# Patient Record
Sex: FEMALE | Race: OTHER | HISPANIC OR LATINO | URBAN - METROPOLITAN AREA
[De-identification: names, ages, dates, MRNs, and addresses within clinical notes are randomized per-mention and may not be internally consistent; named-entity substitution may affect disease eponyms.]

---

## 2019-02-06 ENCOUNTER — OUTPATIENT (OUTPATIENT)
Dept: OUTPATIENT SERVICES | Facility: HOSPITAL | Age: 59
LOS: 1 days | Discharge: HOME | End: 2019-02-06
Payer: OTHER MISCELLANEOUS

## 2019-02-06 VITALS
SYSTOLIC BLOOD PRESSURE: 110 MMHG | HEART RATE: 78 BPM | WEIGHT: 145.95 LBS | RESPIRATION RATE: 16 BRPM | DIASTOLIC BLOOD PRESSURE: 64 MMHG | OXYGEN SATURATION: 99 % | HEIGHT: 63 IN | TEMPERATURE: 98 F

## 2019-02-06 DIAGNOSIS — Z96.651 PRESENCE OF RIGHT ARTIFICIAL KNEE JOINT: Chronic | ICD-10-CM

## 2019-02-06 DIAGNOSIS — Z90.49 ACQUIRED ABSENCE OF OTHER SPECIFIED PARTS OF DIGESTIVE TRACT: Chronic | ICD-10-CM

## 2019-02-06 DIAGNOSIS — Z98.82 BREAST IMPLANT STATUS: Chronic | ICD-10-CM

## 2019-02-06 DIAGNOSIS — M12.261 VILLONODULAR SYNOVITIS (PIGMENTED), RIGHT KNEE: ICD-10-CM

## 2019-02-06 DIAGNOSIS — Z98.890 OTHER SPECIFIED POSTPROCEDURAL STATES: Chronic | ICD-10-CM

## 2019-02-06 DIAGNOSIS — Z96.652 PRESENCE OF LEFT ARTIFICIAL KNEE JOINT: Chronic | ICD-10-CM

## 2019-02-06 LAB
ANION GAP SERPL CALC-SCNC: 6 MMOL/L — SIGNIFICANT CHANGE UP (ref 5–17)
BASOPHILS # BLD AUTO: 0.03 K/UL — SIGNIFICANT CHANGE UP (ref 0–0.2)
BASOPHILS NFR BLD AUTO: 0.2 % — SIGNIFICANT CHANGE UP (ref 0–2)
BUN SERPL-MCNC: 14 MG/DL — SIGNIFICANT CHANGE UP (ref 7–23)
CALCIUM SERPL-MCNC: 9 MG/DL — SIGNIFICANT CHANGE UP (ref 8.5–10.1)
CHLORIDE SERPL-SCNC: 105 MMOL/L — SIGNIFICANT CHANGE UP (ref 96–108)
CO2 SERPL-SCNC: 26 MMOL/L — SIGNIFICANT CHANGE UP (ref 22–31)
CREAT SERPL-MCNC: 0.7 MG/DL — SIGNIFICANT CHANGE UP (ref 0.5–1.3)
EOSINOPHIL # BLD AUTO: 0.09 K/UL — SIGNIFICANT CHANGE UP (ref 0–0.5)
EOSINOPHIL NFR BLD AUTO: 0.7 % — SIGNIFICANT CHANGE UP (ref 0–6)
GLUCOSE SERPL-MCNC: 111 MG/DL — HIGH (ref 70–99)
HCT VFR BLD CALC: 43.9 % — SIGNIFICANT CHANGE UP (ref 34.5–45)
HGB BLD-MCNC: 14 G/DL — SIGNIFICANT CHANGE UP (ref 11.5–15.5)
IMM GRANULOCYTES NFR BLD AUTO: 0.3 % — SIGNIFICANT CHANGE UP (ref 0–1.5)
LYMPHOCYTES # BLD AUTO: 17.1 % — SIGNIFICANT CHANGE UP (ref 13–44)
LYMPHOCYTES # BLD AUTO: 2.29 K/UL — SIGNIFICANT CHANGE UP (ref 1–3.3)
MCHC RBC-ENTMCNC: 28.9 PG — SIGNIFICANT CHANGE UP (ref 27–34)
MCHC RBC-ENTMCNC: 31.9 GM/DL — LOW (ref 32–36)
MCV RBC AUTO: 90.5 FL — SIGNIFICANT CHANGE UP (ref 80–100)
MONOCYTES # BLD AUTO: 0.39 K/UL — SIGNIFICANT CHANGE UP (ref 0–0.9)
MONOCYTES NFR BLD AUTO: 2.9 % — SIGNIFICANT CHANGE UP (ref 2–14)
NEUTROPHILS # BLD AUTO: 10.57 K/UL — HIGH (ref 1.8–7.4)
NEUTROPHILS NFR BLD AUTO: 78.8 % — HIGH (ref 43–77)
NRBC # BLD: 0 /100 WBCS — SIGNIFICANT CHANGE UP (ref 0–0)
PLATELET # BLD AUTO: 348 K/UL — SIGNIFICANT CHANGE UP (ref 150–400)
POTASSIUM SERPL-MCNC: 4.9 MMOL/L — SIGNIFICANT CHANGE UP (ref 3.5–5.3)
POTASSIUM SERPL-SCNC: 4.9 MMOL/L — SIGNIFICANT CHANGE UP (ref 3.5–5.3)
RBC # BLD: 4.85 M/UL — SIGNIFICANT CHANGE UP (ref 3.8–5.2)
RBC # FLD: 13.3 % — SIGNIFICANT CHANGE UP (ref 10.3–14.5)
SODIUM SERPL-SCNC: 137 MMOL/L — SIGNIFICANT CHANGE UP (ref 135–145)
WBC # BLD: 13.41 K/UL — HIGH (ref 3.8–10.5)
WBC # FLD AUTO: 13.41 K/UL — HIGH (ref 3.8–10.5)

## 2019-02-06 PROCEDURE — 93010 ELECTROCARDIOGRAM REPORT: CPT

## 2019-02-06 PROCEDURE — 71046 X-RAY EXAM CHEST 2 VIEWS: CPT | Mod: 26

## 2019-02-06 NOTE — H&P PST ADULT - ASSESSMENT
59 year old female presents to PST for right knee arthroscopy    Plan:  1. PST instructions given ; NPO post midnight   2. Pt instructed to take following meds with sip of water : Lexapro pt to inform anesthesia if she takes pain meds; pt instructed to take Breo day of surgery and albuterol prn   3. EZ wash instructions given   4. Medical Evaluation with Dr De La Rosa 59 year old female presents to PST for right knee arthroscopy    Plan:  1. PST instructions given ; NPO post midnight   2. Pt instructed to take following meds with sip of water : Lexapro pt to inform anesthesia if she takes pain meds; pt instructed to take Breo day of surgery and albuterol prn   3. EZ wash instructions given   4. Medical Evaluation with Dr De La Rosa   5. OR booking notified [facility]

## 2019-02-06 NOTE — H&P PST ADULT - PSH
H/O abdominoplasty  2005  H/O breast implant  silicone 2008  H/O colectomy  2009  H/O total knee replacement, left  2015  H/O total knee replacement, right  2009

## 2019-02-06 NOTE — H&P PST ADULT - HISTORY OF PRESENT ILLNESS
59 year old female with synovitis of right knee s/p right TKR 2009 ;  pt c/o right knee pain and clicking sound; takes pain meds with some pain relief ; she presents to PST for right knee arthroscopy  Pt with history of asthma recent exacerbation 2 weeks ago on prednisone 10 mg day 14/14 as prescribed by pulmonologist in [place] ; Lungs clear )2 sat 99% ; pt denies being intubated for exacerbations

## 2019-02-06 NOTE — H&P PST ADULT - PMH
Asthma    Depression    Diverticulitis    Fibromyalgia    Insomnia    Pneumonia  3/2018  Seasonal allergies    Sepsis  urosepsis 2016  Shingles  1/2018

## 2019-02-06 NOTE — H&P PST ADULT - NSANTHOSAYNRD_GEN_A_CORE
No. TANIYA screening performed.  STOP BANG Legend: 0-2 = LOW Risk; 3-4 = INTERMEDIATE Risk; 5-8 = HIGH Risk

## 2019-02-08 PROBLEM — A41.9 SEPSIS, UNSPECIFIED ORGANISM: Chronic | Status: ACTIVE | Noted: 2019-02-06

## 2019-02-14 RX ORDER — SODIUM CHLORIDE 9 MG/ML
3 INJECTION INTRAMUSCULAR; INTRAVENOUS; SUBCUTANEOUS EVERY 8 HOURS
Qty: 0 | Refills: 0 | Status: DISCONTINUED | OUTPATIENT
Start: 2019-02-15 | End: 2019-03-02

## 2019-02-15 ENCOUNTER — RESULT REVIEW (OUTPATIENT)
Age: 59
End: 2019-02-15

## 2019-02-15 ENCOUNTER — OUTPATIENT (OUTPATIENT)
Dept: OUTPATIENT SERVICES | Facility: HOSPITAL | Age: 59
LOS: 1 days | Discharge: HOME | End: 2019-02-15
Payer: OTHER MISCELLANEOUS

## 2019-02-15 VITALS
TEMPERATURE: 98 F | HEART RATE: 81 BPM | DIASTOLIC BLOOD PRESSURE: 66 MMHG | OXYGEN SATURATION: 97 % | RESPIRATION RATE: 12 BRPM | SYSTOLIC BLOOD PRESSURE: 123 MMHG

## 2019-02-15 VITALS
TEMPERATURE: 98 F | WEIGHT: 145.95 LBS | RESPIRATION RATE: 16 BRPM | HEART RATE: 80 BPM | OXYGEN SATURATION: 99 % | SYSTOLIC BLOOD PRESSURE: 110 MMHG | HEIGHT: 63 IN | DIASTOLIC BLOOD PRESSURE: 67 MMHG

## 2019-02-15 DIAGNOSIS — Z98.890 OTHER SPECIFIED POSTPROCEDURAL STATES: Chronic | ICD-10-CM

## 2019-02-15 DIAGNOSIS — Z98.82 BREAST IMPLANT STATUS: Chronic | ICD-10-CM

## 2019-02-15 DIAGNOSIS — Z96.652 PRESENCE OF LEFT ARTIFICIAL KNEE JOINT: Chronic | ICD-10-CM

## 2019-02-15 DIAGNOSIS — Z96.651 PRESENCE OF RIGHT ARTIFICIAL KNEE JOINT: Chronic | ICD-10-CM

## 2019-02-15 DIAGNOSIS — Z90.49 ACQUIRED ABSENCE OF OTHER SPECIFIED PARTS OF DIGESTIVE TRACT: Chronic | ICD-10-CM

## 2019-02-15 PROCEDURE — 88304 TISSUE EXAM BY PATHOLOGIST: CPT | Mod: 26

## 2019-02-15 RX ORDER — OXYCODONE AND ACETAMINOPHEN 5; 325 MG/1; MG/1
1 TABLET ORAL
Qty: 30 | Refills: 0
Start: 2019-02-15 | End: 2019-02-21

## 2019-02-15 RX ORDER — FAMOTIDINE 10 MG/ML
20 INJECTION INTRAVENOUS ONCE
Qty: 0 | Refills: 0 | Status: COMPLETED | OUTPATIENT
Start: 2019-02-15 | End: 2019-02-15

## 2019-02-15 RX ORDER — ASPIRIN/CALCIUM CARB/MAGNESIUM 324 MG
1 TABLET ORAL
Qty: 30 | Refills: 0
Start: 2019-02-15 | End: 2019-03-16

## 2019-02-15 RX ORDER — CELECOXIB 200 MG/1
200 CAPSULE ORAL ONCE
Qty: 0 | Refills: 0 | Status: COMPLETED | OUTPATIENT
Start: 2019-02-15 | End: 2019-02-15

## 2019-02-15 RX ORDER — OXYCODONE HYDROCHLORIDE 5 MG/1
5 TABLET ORAL ONCE
Qty: 0 | Refills: 0 | Status: DISCONTINUED | OUTPATIENT
Start: 2019-02-15 | End: 2019-02-15

## 2019-02-15 RX ORDER — ONDANSETRON 8 MG/1
4 TABLET, FILM COATED ORAL ONCE
Qty: 0 | Refills: 0 | Status: DISCONTINUED | OUTPATIENT
Start: 2019-02-15 | End: 2019-02-15

## 2019-02-15 RX ORDER — OXYCODONE HYDROCHLORIDE 5 MG/1
10 TABLET ORAL ONCE
Qty: 0 | Refills: 0 | Status: DISCONTINUED | OUTPATIENT
Start: 2019-02-15 | End: 2019-02-15

## 2019-02-15 RX ORDER — ACETAMINOPHEN 500 MG
975 TABLET ORAL ONCE
Qty: 0 | Refills: 0 | Status: COMPLETED | OUTPATIENT
Start: 2019-02-15 | End: 2019-02-15

## 2019-02-15 RX ORDER — FENTANYL CITRATE 50 UG/ML
50 INJECTION INTRAVENOUS
Qty: 0 | Refills: 0 | Status: DISCONTINUED | OUTPATIENT
Start: 2019-02-15 | End: 2019-02-15

## 2019-02-15 RX ORDER — SODIUM CHLORIDE 9 MG/ML
1000 INJECTION, SOLUTION INTRAVENOUS
Qty: 0 | Refills: 0 | Status: DISCONTINUED | OUTPATIENT
Start: 2019-02-15 | End: 2019-02-15

## 2019-02-15 RX ORDER — ASPIRIN/CALCIUM CARB/MAGNESIUM 324 MG
1 TABLET ORAL
Qty: 30 | Refills: 0 | OUTPATIENT
Start: 2019-02-15 | End: 2019-03-16

## 2019-02-15 RX ORDER — ESCITALOPRAM OXALATE 10 MG/1
1 TABLET, FILM COATED ORAL
Qty: 0 | Refills: 0 | COMMUNITY

## 2019-02-15 RX ADMIN — CELECOXIB 200 MILLIGRAM(S): 200 CAPSULE ORAL at 15:33

## 2019-02-15 RX ADMIN — Medication 975 MILLIGRAM(S): at 15:33

## 2019-02-15 RX ADMIN — CELECOXIB 200 MILLIGRAM(S): 200 CAPSULE ORAL at 15:32

## 2019-02-15 RX ADMIN — OXYCODONE HYDROCHLORIDE 10 MILLIGRAM(S): 5 TABLET ORAL at 15:33

## 2019-02-15 RX ADMIN — OXYCODONE HYDROCHLORIDE 5 MILLIGRAM(S): 5 TABLET ORAL at 17:45

## 2019-02-15 RX ADMIN — OXYCODONE HYDROCHLORIDE 10 MILLIGRAM(S): 5 TABLET ORAL at 15:32

## 2019-02-15 RX ADMIN — Medication 975 MILLIGRAM(S): at 15:32

## 2019-02-15 RX ADMIN — FAMOTIDINE 20 MILLIGRAM(S): 10 INJECTION INTRAVENOUS at 15:32

## 2019-02-15 NOTE — ASU DISCHARGE PLAN (ADULT/PEDIATRIC). - SPECIAL INSTRUCTIONS
weight bearing as tolerating of right lower extremity   remove dressings in 48 hours and place waterproof bandage on top

## 2019-02-15 NOTE — ASU DISCHARGE PLAN (ADULT/PEDIATRIC). - MEDICATION SUMMARY - MEDICATIONS TO TAKE
I will START or STAY ON the medications listed below when I get home from the hospital:    Aspirin Enteric Coated 325 mg oral delayed release tablet  -- 1 tab(s) by mouth once a day . For Blood Clot Prevention   -- Swallow whole.  Do not crush.  Take with food or milk.    -- Indication: For dvt ppx    Percocet 5/325 oral tablet  -- 1 tab(s) by mouth every 4 to 6 hours, As Needed -for severe pain MDD:4-6 tabs   -- Caution federal law prohibits the transfer of this drug to any person other  than the person for whom it was prescribed.  May cause drowsiness.  Alcohol may intensify this effect.  Use care when operating dangerous machinery.  This prescription cannot be refilled.  This product contains acetaminophen.  Do not use  with any other product containing acetaminophen to prevent possible liver damage.  Using more of this medication than prescribed may cause serious breathing problems.    -- Indication: For Severe pain    Lyrica 150 mg oral capsule  -- 1 cap(s) by mouth 2 times a day  -- Indication: For per md    Allegra 180 mg oral tablet  -- 1 tab(s) by mouth once a day  -- Indication: For per md    zolpidem 12.5 mg oral tablet, extended release  -- 1 tab(s) by mouth once a day (at bedtime)  -- Indication: For per md    Breo Ellipta 200 mcg-25 mcg/inh inhalation powder  -- 1 puff(s) inhaled once a day  -- Indication: For per md    ProAir HFA 90 mcg/inh inhalation aerosol  -- 2 puff(s) inhaled 4 times a day, As Needed  -- Indication: For per md    montelukast 10 mg oral tablet  -- 1 tab(s) by mouth once a day-  -- Indication: For per md I will START or STAY ON the medications listed below when I get home from the hospital:    Percocet 5/325 oral tablet  -- 1 tab(s) by mouth every 4 to 6 hours, As Needed -for severe pain MDD:4-6 tabs   -- Caution federal law prohibits the transfer of this drug to any person other  than the person for whom it was prescribed.  May cause drowsiness.  Alcohol may intensify this effect.  Use care when operating dangerous machinery.  This prescription cannot be refilled.  This product contains acetaminophen.  Do not use  with any other product containing acetaminophen to prevent possible liver damage.  Using more of this medication than prescribed may cause serious breathing problems.    -- Indication: For Severe pain    Aspirin Enteric Coated 325 mg oral delayed release tablet  -- 1 tab(s) by mouth once a day . For Blood Clot Prevention   -- Swallow whole.  Do not crush.  Take with food or milk.    -- Indication: For dvt ppx    Lyrica 150 mg oral capsule  -- 1 cap(s) by mouth 2 times a day  -- Indication: For per md    Allegra 180 mg oral tablet  -- 1 tab(s) by mouth once a day  -- Indication: For per md    zolpidem 12.5 mg oral tablet, extended release  -- 1 tab(s) by mouth once a day (at bedtime)  -- Indication: For per md    Breo Ellipta 200 mcg-25 mcg/inh inhalation powder  -- 1 puff(s) inhaled once a day  -- Indication: For per md    ProAir HFA 90 mcg/inh inhalation aerosol  -- 2 puff(s) inhaled 4 times a day, As Needed  -- Indication: For per md    montelukast 10 mg oral tablet  -- 1 tab(s) by mouth once a day-  -- Indication: For per md

## 2019-02-15 NOTE — ASU DISCHARGE PLAN (ADULT/PEDIATRIC). - MEDICATION SUMMARY - MEDICATIONS TO STOP TAKING
I will STOP taking the medications listed below when I get home from the hospital:  None I will STOP taking the medications listed below when I get home from the hospital:    oxycodone-acetaminophen 5 mg-325 mg oral tablet  -- 1 tab(s) by mouth every 6 hours, As Needed  takes 1 tab daily

## 2019-02-20 LAB — SURGICAL PATHOLOGY FINAL REPORT - CH: SIGNIFICANT CHANGE UP

## 2019-02-25 DIAGNOSIS — Z90.49 ACQUIRED ABSENCE OF OTHER SPECIFIED PARTS OF DIGESTIVE TRACT: ICD-10-CM

## 2019-02-25 DIAGNOSIS — Z91.040 LATEX ALLERGY STATUS: ICD-10-CM

## 2019-02-25 DIAGNOSIS — G89.29 OTHER CHRONIC PAIN: ICD-10-CM

## 2019-02-25 DIAGNOSIS — M65.9 SYNOVITIS AND TENOSYNOVITIS, UNSPECIFIED: ICD-10-CM

## 2019-02-25 DIAGNOSIS — M79.7 FIBROMYALGIA: ICD-10-CM

## 2019-02-25 DIAGNOSIS — G47.00 INSOMNIA, UNSPECIFIED: ICD-10-CM

## 2019-02-25 DIAGNOSIS — F32.9 MAJOR DEPRESSIVE DISORDER, SINGLE EPISODE, UNSPECIFIED: ICD-10-CM

## 2019-02-25 DIAGNOSIS — Z79.891 LONG TERM (CURRENT) USE OF OPIATE ANALGESIC: ICD-10-CM

## 2019-02-25 DIAGNOSIS — J45.901 UNSPECIFIED ASTHMA WITH (ACUTE) EXACERBATION: ICD-10-CM

## 2019-02-25 DIAGNOSIS — Z96.653 PRESENCE OF ARTIFICIAL KNEE JOINT, BILATERAL: ICD-10-CM

## 2022-10-18 PROBLEM — M79.7 FIBROMYALGIA: Chronic | Status: ACTIVE | Noted: 2019-02-06

## 2022-10-18 PROBLEM — F32.9 MAJOR DEPRESSIVE DISORDER, SINGLE EPISODE, UNSPECIFIED: Chronic | Status: ACTIVE | Noted: 2019-02-06

## 2022-10-18 PROBLEM — K57.92 DIVERTICULITIS OF INTESTINE, PART UNSPECIFIED, WITHOUT PERFORATION OR ABSCESS WITHOUT BLEEDING: Chronic | Status: ACTIVE | Noted: 2019-02-06

## 2022-10-18 PROBLEM — J45.909 UNSPECIFIED ASTHMA, UNCOMPLICATED: Chronic | Status: ACTIVE | Noted: 2019-02-06

## 2022-10-18 PROBLEM — J18.9 PNEUMONIA, UNSPECIFIED ORGANISM: Chronic | Status: ACTIVE | Noted: 2019-02-06

## 2022-10-18 PROBLEM — J30.2 OTHER SEASONAL ALLERGIC RHINITIS: Chronic | Status: ACTIVE | Noted: 2019-02-06

## 2022-10-18 PROBLEM — B02.9 ZOSTER WITHOUT COMPLICATIONS: Chronic | Status: ACTIVE | Noted: 2019-02-06

## 2022-10-18 PROBLEM — G47.00 INSOMNIA, UNSPECIFIED: Chronic | Status: ACTIVE | Noted: 2019-02-06

## 2022-11-04 ENCOUNTER — NON-APPOINTMENT (OUTPATIENT)
Age: 62
End: 2022-11-04

## 2022-11-04 DIAGNOSIS — Z87.09 PERSONAL HISTORY OF OTHER DISEASES OF THE RESPIRATORY SYSTEM: ICD-10-CM
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
## 2022-11-04 DIAGNOSIS — Z82.61 FAMILY HISTORY OF ARTHRITIS: ICD-10-CM

## 2022-11-04 DIAGNOSIS — M25.462 EFFUSION, LEFT KNEE: ICD-10-CM

## 2022-11-04 DIAGNOSIS — M25.562 PAIN IN LEFT KNEE: ICD-10-CM

## 2022-11-04 DIAGNOSIS — Z56.0 UNEMPLOYMENT, UNSPECIFIED: ICD-10-CM

## 2022-11-04 PROBLEM — Z00.00 ENCOUNTER FOR PREVENTIVE HEALTH EXAMINATION: Status: ACTIVE | Noted: 2022-11-04

## 2022-11-04 RX ORDER — ZOLPIDEM TARTRATE 12.5 MG/1
12.5 TABLET, EXTENDED RELEASE ORAL DAILY
Refills: 0 | Status: ACTIVE | COMMUNITY

## 2022-11-04 RX ORDER — OXYCODONE HYDROCHLORIDE AND ACETAMINOPHEN 10; 325 MG/1; MG/1
10-325 TABLET ORAL EVERY 6 HOURS
Refills: 0 | Status: ACTIVE | COMMUNITY

## 2022-11-04 RX ORDER — PREDNISONE 10 MG/1
10 TABLET ORAL DAILY
Refills: 0 | Status: ACTIVE | COMMUNITY

## 2022-11-04 RX ORDER — PREGABALIN 200 MG/1
200 CAPSULE ORAL TWICE DAILY
Refills: 0 | Status: ACTIVE | COMMUNITY

## 2022-11-04 SDOH — ECONOMIC STABILITY - INCOME SECURITY: UNEMPLOYMENT, UNSPECIFIED: Z56.0

## 2022-11-18 ENCOUNTER — APPOINTMENT (OUTPATIENT)
Dept: ORTHOPEDIC SURGERY | Facility: HOSPITAL | Age: 62
End: 2022-11-18

## 2022-12-05 ENCOUNTER — NON-APPOINTMENT (OUTPATIENT)
Age: 62
End: 2022-12-05

## 2022-12-05 DIAGNOSIS — Z80.9 FAMILY HISTORY OF MALIGNANT NEOPLASM, UNSPECIFIED: ICD-10-CM

## 2022-12-12 ENCOUNTER — APPOINTMENT (OUTPATIENT)
Dept: ORTHOPEDIC SURGERY | Facility: CLINIC | Age: 62
End: 2022-12-12

## 2023-05-03 ENCOUNTER — OUTPATIENT (OUTPATIENT)
Dept: OUTPATIENT SERVICES | Facility: HOSPITAL | Age: 63
LOS: 1 days | End: 2023-05-03
Payer: COMMERCIAL

## 2023-05-03 ENCOUNTER — APPOINTMENT (OUTPATIENT)
Dept: ORTHOPEDIC SURGERY | Facility: CLINIC | Age: 63
End: 2023-05-03
Payer: OTHER MISCELLANEOUS

## 2023-05-03 VITALS
TEMPERATURE: 99 F | RESPIRATION RATE: 16 BRPM | HEIGHT: 63 IN | WEIGHT: 147.93 LBS | SYSTOLIC BLOOD PRESSURE: 129 MMHG | OXYGEN SATURATION: 97 % | HEART RATE: 81 BPM | DIASTOLIC BLOOD PRESSURE: 53 MMHG

## 2023-05-03 VITALS — WEIGHT: 148 LBS | BODY MASS INDEX: 27.23 KG/M2 | HEIGHT: 62 IN

## 2023-05-03 DIAGNOSIS — Z96.651 PRESENCE OF RIGHT ARTIFICIAL KNEE JOINT: Chronic | ICD-10-CM

## 2023-05-03 DIAGNOSIS — Z98.82 BREAST IMPLANT STATUS: Chronic | ICD-10-CM

## 2023-05-03 DIAGNOSIS — Z90.49 ACQUIRED ABSENCE OF OTHER SPECIFIED PARTS OF DIGESTIVE TRACT: Chronic | ICD-10-CM

## 2023-05-03 DIAGNOSIS — Z01.818 ENCOUNTER FOR OTHER PREPROCEDURAL EXAMINATION: ICD-10-CM

## 2023-05-03 DIAGNOSIS — Z98.890 OTHER SPECIFIED POSTPROCEDURAL STATES: Chronic | ICD-10-CM

## 2023-05-03 DIAGNOSIS — Z96.652 PRESENCE OF LEFT ARTIFICIAL KNEE JOINT: Chronic | ICD-10-CM

## 2023-05-03 LAB
ANION GAP SERPL CALC-SCNC: 5 MMOL/L — SIGNIFICANT CHANGE UP (ref 5–17)
BASOPHILS # BLD AUTO: 0.03 K/UL — SIGNIFICANT CHANGE UP (ref 0–0.2)
BASOPHILS NFR BLD AUTO: 0.3 % — SIGNIFICANT CHANGE UP (ref 0–2)
BUN SERPL-MCNC: 18 MG/DL — SIGNIFICANT CHANGE UP (ref 7–23)
CALCIUM SERPL-MCNC: 9.4 MG/DL — SIGNIFICANT CHANGE UP (ref 8.5–10.1)
CHLORIDE SERPL-SCNC: 108 MMOL/L — SIGNIFICANT CHANGE UP (ref 96–108)
CO2 SERPL-SCNC: 28 MMOL/L — SIGNIFICANT CHANGE UP (ref 22–31)
CREAT SERPL-MCNC: 0.62 MG/DL — SIGNIFICANT CHANGE UP (ref 0.5–1.3)
EGFR: 101 ML/MIN/1.73M2 — SIGNIFICANT CHANGE UP
EOSINOPHIL # BLD AUTO: 0.08 K/UL — SIGNIFICANT CHANGE UP (ref 0–0.5)
EOSINOPHIL NFR BLD AUTO: 0.7 % — SIGNIFICANT CHANGE UP (ref 0–6)
GLUCOSE SERPL-MCNC: 95 MG/DL — SIGNIFICANT CHANGE UP (ref 70–99)
HCT VFR BLD CALC: 44.3 % — SIGNIFICANT CHANGE UP (ref 34.5–45)
HGB BLD-MCNC: 14.1 G/DL — SIGNIFICANT CHANGE UP (ref 11.5–15.5)
IMM GRANULOCYTES NFR BLD AUTO: 0.3 % — SIGNIFICANT CHANGE UP (ref 0–0.9)
LYMPHOCYTES # BLD AUTO: 37.1 % — SIGNIFICANT CHANGE UP (ref 13–44)
LYMPHOCYTES # BLD AUTO: 4.09 K/UL — HIGH (ref 1–3.3)
MCHC RBC-ENTMCNC: 28.7 PG — SIGNIFICANT CHANGE UP (ref 27–34)
MCHC RBC-ENTMCNC: 31.8 GM/DL — LOW (ref 32–36)
MCV RBC AUTO: 90 FL — SIGNIFICANT CHANGE UP (ref 80–100)
MONOCYTES # BLD AUTO: 1.1 K/UL — HIGH (ref 0–0.9)
MONOCYTES NFR BLD AUTO: 10 % — SIGNIFICANT CHANGE UP (ref 2–14)
NEUTROPHILS # BLD AUTO: 5.7 K/UL — SIGNIFICANT CHANGE UP (ref 1.8–7.4)
NEUTROPHILS NFR BLD AUTO: 51.6 % — SIGNIFICANT CHANGE UP (ref 43–77)
PLATELET # BLD AUTO: 315 K/UL — SIGNIFICANT CHANGE UP (ref 150–400)
POTASSIUM SERPL-MCNC: 4.6 MMOL/L — SIGNIFICANT CHANGE UP (ref 3.5–5.3)
POTASSIUM SERPL-SCNC: 4.6 MMOL/L — SIGNIFICANT CHANGE UP (ref 3.5–5.3)
RBC # BLD: 4.92 M/UL — SIGNIFICANT CHANGE UP (ref 3.8–5.2)
RBC # FLD: 13.1 % — SIGNIFICANT CHANGE UP (ref 10.3–14.5)
SODIUM SERPL-SCNC: 141 MMOL/L — SIGNIFICANT CHANGE UP (ref 135–145)
WBC # BLD: 11.03 K/UL — HIGH (ref 3.8–10.5)
WBC # FLD AUTO: 11.03 K/UL — HIGH (ref 3.8–10.5)

## 2023-05-03 PROCEDURE — 93010 ELECTROCARDIOGRAM REPORT: CPT

## 2023-05-03 PROCEDURE — 99213 OFFICE O/P EST LOW 20 MIN: CPT

## 2023-05-03 PROCEDURE — 85025 COMPLETE CBC W/AUTO DIFF WBC: CPT

## 2023-05-03 PROCEDURE — 99214 OFFICE O/P EST MOD 30 MIN: CPT | Mod: 25

## 2023-05-03 PROCEDURE — 80048 BASIC METABOLIC PNL TOTAL CA: CPT

## 2023-05-03 PROCEDURE — 93005 ELECTROCARDIOGRAM TRACING: CPT

## 2023-05-03 PROCEDURE — 36415 COLL VENOUS BLD VENIPUNCTURE: CPT

## 2023-05-03 RX ORDER — FEXOFENADINE HCL 30 MG
1 TABLET ORAL
Qty: 0 | Refills: 0 | DISCHARGE

## 2023-05-03 NOTE — H&P PST ADULT - NSICDXPASTSURGICALHX_GEN_ALL_CORE_FT
PAST SURGICAL HISTORY:  H/O abdominoplasty 2005    H/O breast implant silicone 2008    H/O colectomy 2009    H/O total knee replacement, left 2015    H/O total knee replacement, right 2009

## 2023-05-03 NOTE — H&P PST ADULT - NSICDXPASTMEDICALHX_GEN_ALL_CORE_FT
PAST MEDICAL HISTORY:  Asthma     Cataract     Depression     Diverticulitis     Fibromyalgia     Insomnia     Osteoarthritis     Pneumonia 3/2018    Seasonal allergies     Sepsis urosepsis 2016    Shingles 1/2018

## 2023-05-03 NOTE — H&P PST ADULT - NSWEIGHTCALCTOOLDRUG_GEN_A_CORE
OFFICE VISIT          Patient: Esther Salcedo Date of Service: 2021   : 1994 MRN: [id number]     Chief Complaint   Patient presents with   • Office Visit     Pt here for yearly check up labs Has ring shaped rash over upper torso with one on thigh         SUBJECTIVE:   HISTORY OF PRESENT ILLNESS:  Esther Salcedo is a 26 year old female who presents today for a rash that she has had fore about a week. The rashes are on her back, abdomen and arms . They are slightly itchy. She works at [employer] and the Residents there thought she had ring worm    On physical examination she has a larger patch on her right upper back    She also has a dermatitis on her dorsal hands form all the handwashing     PAST MEDICAL HISTORY:  History reviewed. No pertinent past medical history.    MEDICATIONS:  Current Outpatient Medications   Medication Sig   • Norgestimate-Ethinyl Estradiol (Tri-Lo-Amanda) 0.18/0.215/0.25 MG-25 MCG tablet Take 1 tablet by mouth daily.   • triamcinolone (ARISTOCORT) 0.1 % cream Apply topically 2 times daily.   • Norgestimate-Ethinyl Estradiol (ORTHO TRI-CYCLEN LO) 0.18/0.215/0.25 MG-25 MCG tablet Take 1 tablet by mouth daily.   • betamethasone dipropionate (DIPROSONE) 0.05 % cream Apply topically 2 times daily.   • SENNA PO      No current facility-administered medications for this visit.        ALLERGIES:  ALLERGIES:   Allergen Reactions   • No Known Allergies Other (See Comments)       PAST SURGICAL HISTORY:  History reviewed. No pertinent surgical history.    FAMILY HISTORY:  Family History   Problem Relation Age of Onset   • Patient is unaware of any medical problems Other        SOCIAL HISTORY:  Social History     Tobacco Use   • Smoking status: Never Smoker   • Smokeless tobacco: Never Used   Substance Use Topics   • Alcohol use: Yes     Frequency: 2-4 times a month     Drinks per session: 1 or 2     Binge frequency: Never     Comment: socially   • Drug use: Never       Review of  Systems   Constitutional: Negative.    HENT: Negative.    Eyes: Negative.    Respiratory: Negative.    Cardiovascular: Negative.    Genitourinary: Negative.    Musculoskeletal: Negative.    Skin: Positive for itching and rash.        Round, dry appearing  rashes on back abdomen and arms. One large one on her right upper back.   Neurological: Negative.    Endo/Heme/Allergies: Negative.    Psychiatric/Behavioral: Negative.    All other systems reviewed and are negative.      OBJECTIVE:     Physical Exam   Constitutional: She is oriented to person, place, and time and well-developed, well-nourished, and in no distress.   HENT:   Head: Normocephalic and atraumatic.   Right Ear: External ear normal.   Left Ear: External ear normal.   Nose: Nose normal.   Mouth/Throat: Oropharynx is clear and moist.   Eyes: Conjunctivae and EOM are normal.   Neck: Normal range of motion. Neck supple.   Cardiovascular: Normal rate, regular rhythm and normal heart sounds.   Pulmonary/Chest: Effort normal and breath sounds normal.   Abdominal: Soft. Bowel sounds are normal.   Musculoskeletal: Normal range of motion.   Neurological: She is alert and oriented to person, place, and time. Gait normal.   Skin: Skin is warm and dry. Rash (ry patch on back anc abdomen, right upper back larger patch- [place] patch with dry collarette and erythema  surrounding the collarette) noted. There is erythema.   Psychiatric: Mood, memory, affect and judgment normal.   Nursing note and vitals reviewed.      Visit Vitals  /77   Pulse 72   Temp 97.6 °F (36.4 °C) (Tympanic)   Resp 16   Ht 5' 6\" (1.676 m)   Wt 62.9 kg (138 lb 10.7 oz)   LMP 01/09/2021 (Exact Date)   SpO2 100%   BMI 22.38 kg/m²       DIAGNOSTIC STUDIES:   LAB RESULTS:    Lab Services on 01/19/2021   Component Date Value Ref Range Status   • Fasting Status 01/19/2021 12  Hours Final   • Sodium 01/19/2021 140  135 - 145 mmol/L Final   • Potassium 01/19/2021 4.7  3.4 - 5.1 mmol/L Final   •  Chloride 01/19/2021 106  98 - 107 mmol/L Final   • Carbon Dioxide 01/19/2021 27  21 - 32 mmol/L Final   • Anion Gap 01/19/2021 12  10 - 20 mmol/L Final   • Glucose 01/19/2021 76  65 - 99 mg/dL Final   • BUN 01/19/2021 18  6 - 20 mg/dL Final   • Creatinine 01/19/2021 0.75  0.51 - 0.95 mg/dL Final   • Glomerular Filtration Rate 01/19/2021 >90  >90 mL/min/1.73m2 Final   • BUN/ Creatinine Ratio 01/19/2021 24  7 - 25 Final   • Calcium 01/19/2021 9.4  8.4 - 10.2 mg/dL Final   • Bilirubin, Total 01/19/2021 0.4  0.2 - 1.0 mg/dL Final   • GOT/AST 01/19/2021 21  <=37 Units/L Final   • GPT/ALT 01/19/2021 24  <64 Units/L Final   • Alkaline Phosphatase 01/19/2021 53  45 - 117 Units/L Final   • Albumin 01/19/2021 4.2  3.6 - 5.1 g/dL Final   • Protein, Total 01/19/2021 7.4  6.4 - 8.2 g/dL Final   • Globulin 01/19/2021 3.2  2.0 - 4.0 g/dL Final   • A/G Ratio 01/19/2021 1.3  1.0 - 2.4 Final   • TSH 01/19/2021 1.872  0.350 - 5.000 mcUnits/mL Final   • Fasting Status 01/19/2021 12  Hours Final   • Cholesterol 01/19/2021 206* <=189 mg/dL Final   • Triglycerides 01/19/2021 42  <=114 mg/dL Final   • HDL 01/19/2021 116  >=46 mg/dL Final   • LDL 01/19/2021 82  <=119 mg/dL Final   • Non-HDL Cholesterol 01/19/2021 90  <=149 mg/dL Final   • Cholesterol/ HDL Ratio 01/19/2021 1.8  <=4.4 Final   • WBC 01/19/2021 3.7* 4.2 - 11.0 K/mcL Final   • RBC 01/19/2021 3.99* 4.00 - 5.20 mil/mcL Final   • HGB 01/19/2021 11.1* 12.0 - 15.5 g/dL Final   • HCT 01/19/2021 36.5  36.0 - 46.5 % Final   • MCV 01/19/2021 91.5  78.0 - 100.0 fl Final   • MCH 01/19/2021 27.8  26.0 - 34.0 pg Final   • MCHC 01/19/2021 30.4* 32.0 - 36.5 g/dL Final   • RDW-CV 01/19/2021 16.7* 11.0 - 15.0 % Final   • RDW-SD 01/19/2021 56.7* 39.0 - 50.0 fL Final   • PLT 01/19/2021 328  140 - 450 K/mcL Final   • NRBC 01/19/2021 0  <=0 /100 WBC Final   • Neutrophil, Percent 01/19/2021 44  % Final   • Lymphocytes, Percent 01/19/2021 40  % Final   • Mono, Percent 01/19/2021 13  % Final   •  Eosinophils, Percent 01/19/2021 2  % Final   • Basophils, Percent 01/19/2021 1  % Final   • Immature Granulocytes 01/19/2021 0  % Final   • Absolute Neutrophils 01/19/2021 1.6* 1.8 - 7.7 K/mcL Final   • Absolute Lymphocytes 01/19/2021 1.4  1.0 - 4.8 K/mcL Final   • Absolute Monocytes 01/19/2021 0.5  0.3 - 0.9 K/mcL Final   • Absolute Eosinophils  01/19/2021 0.1  0.0 - 0.5 K/mcL Final   • Absolute Basophils 01/19/2021 0.0  0.0 - 0.3 K/mcL Final   • Absolute Immmature Granulocytes 01/19/2021 0.0  0.0 - 0.2 K/mcL Final       Assessment AND PLAN:   Esther was seen today for office visit.    Diagnoses and all orders for this visit:    Annual physical exam  -     CBC WITH DIFFERENTIAL; Future  -     COMPREHENSIVE METABOLIC PANEL; Future  -     THYROID STIMULATING HORMONE REFLEX; Future  -     LIPID PANEL WITH REFLEX; Future    Pityriasis rosea  -     triamcinolone (ARISTOCORT) 0.1 % cream; Apply topically 2 times daily.    Dermatitis    Physical done   Fasting labs ordered   patient has Piryriasis rosea- classic [place] patch- right upper back. reassurance given that this is self- limiting and may be caused by the herpes 7 virus  May take 6-12 week to resolve.   sent a topical steroid to use on the areas as well as the dermatitis on her hands.   moisturize hands with a good lotion- Eucerin or Aquaphor 1-2 times a day   she agreed        used

## 2023-05-03 NOTE — H&P PST ADULT - HISTORY OF PRESENT ILLNESS
59 year old female with synovitis of right knee s/p right TKR 2009 ;  pt c/o right knee pain and clicking sound; takes pain meds with some pain relief ; she presents to PST for right knee arthroscopy  Pt with history of asthma recent exacerbation 2 weeks ago on prednisone 10 mg day 14/14 as prescribed by pulmonologist in [place] ; Lungs clear )2 sat 99% ; pt denies being intubated for exacerbations 63  year old female with synovitis of right knee s/p right TKR 2009 ;  pt c/o right knee pain and clicking sound and difficulty with walking down stairs due to pain and limited ROM   ; she presents to PST for right knee arthroscopy

## 2023-05-03 NOTE — H&P PST ADULT - AGENT'S NAME
Dermatology Patient Note  [address]  [address] [address]  Dept: [phone]  Dept Fax: [phone]      VISIT DATE: 12/14/2017   REFERRING PROVIDER: No ref. provider found      Lilian Hillman is a 10 y.o. male  who presents today in the office for:    New Patient (eczema on back, stomach and legs; worsening over past 2 years; using hydrocortisone which has helped; erucerin in past as moisturizer.)      HISTORY OF PRESENT ILLNESS:  [facility] Eczema:    Mak Reilly was seen today for initial evaluation of eczema. Duration of Eczema:  several years    Course: persistent    Areas of Involvement: Generalized    Associated Symptoms: Pruritis    Exacerbating Factors: Weather Changes    Current Bathing Routine: Fragrance soaps    Current Moisturizing Routine: not regulalry    Current Medications for Eczema: hydrocortisone    Previously Tried Topical Medications: none    Previously Tried Systemic Medications: none    Previous Evaluation: None    Problem Specific Family Hx: Eczema        CURRENT MEDICATIONS:   Current Outpatient Prescriptions   Medication Sig Dispense Refill    triamcinolone (KENALOG) 0.1 % cream Apply to rash twice daily (not face, armpit or groin) 80 g 1    Skin Protectants, Misc. (EUCERIN) cream Apply topically twice daily 454 g 11     No current facility-administered medications for this visit. ALLERGIES:   No Known Allergies    SOCIAL HISTORY:  Social History   Substance Use Topics    Smoking status: Never Smoker    Smokeless tobacco: Never Used    Alcohol use Not on file       REVIEW OF SYSTEMS:  Review of Systems   Constitutional: Negative.       Skin: Denies any new changing, growing or bleeding lesions or rashes except as described in the HPI     PHYSICAL EXAM:   Pulse 92   Ht 4' 9.5\" (1.461 m)   Wt (!) 128 lb (58.1 kg)   SpO2 98%   BMI 27.22 kg/m²     General Exam:  General Appearance: No acute distress, Well nourished     Neuro: Alert  Psych: Not Performed   Lymph Node: Not performed    Cutaneous Exam: Performed as documented in clinic note below. Total skin excluding undergarment areas, which includes the head/face, neck, both arms, chest, back, abdomen, both legs, digits and/or nails, was examined. Pertinent Physical Exam Findings:  Physical Exam   Skin:        Diffuse xerosis       Medical Necessity of Exam Performed:   Distribution of patient concerns, widespread rash    Additional Diagnostic Testing performed during exam: Not performed ,  Not performed    ASSESSMENT:  1. Intrinsic atopic dermatitis         Plan of Action is as Follows:  Assessment 1. Intrinsic atopic dermatitis  1. Discussed that the patient has eczema a chronic condition which can be flared by bacteria or environmental allergies  2. Discussed the treatments for eczema including topical medications, antihistamines and fragrance free products. 3. Discussed need to moisturize twice daily with a thick bland emollient (Eucerin)  4. Start triamcinolone 0.1% cream BID to eczema on the body  5. Discussed side effects of topical steroids including skin thinning and atrophy and importance of using topical steroids only on active eczema            Patient Instructions   Eczema Instructions    The medicines and treatments used to take care of your child's eczema may change depending on the condition of the skin. Eczema flare-ups may come and go. We cannot cure eczema, but we can help control it with these treatments. Baths:  1-2 times a day with a mild soap such as Dove for Sensitive Skin, Cetaphil Cleanser, Cerave Cleanser, Aquaphor Wash or Vanicream Bar. Apply moisturizer within 3 minutes of patting dry. Medicines Prescribed by your Doctor    These medications should only be put on the eczema that you can see or feel. Eczema patches are red, rough, thickened or itchy. Once the skin looks and feels normal stop the medicated creams and ointments.     These medications are chosen based on the location and thickness of your child's eczema. We always want to use the weakest medicated creams and ointments that will control your child's eczema. This will reduce the risk of side effects. If your child's eczema is not improving on this treatment plan or you need to use your Rescue medicines longer than recommended please call the dermatology clinic. Maintenance Medicines    Maintenance medicines can be used any day when eczema is seen or felt. Apply triamcinolone to eczema on body, arms and legs 2 times a day when eczema is present. Moisturizer: This should be applied to all of our child's skin (including skin with eczema and skin without eczema). Continue to use this everyday even when your child's eczema is doing really well. Apply moisturizer at least 2 times a day to all of your child's skin. If you are applying a prescription cream at the same time as a moisturizer, put the prescription cream on first and your moisturizer on top. Skin color changes may stay after the rough eczema is gone. The color of the skin where the eczema was may be lighter or darker after the eczema has cleared. These color changes or \"footprints\" will resolve over time and do not improve faster putting your maintenance or rescue medicines on them. Remember that your eczema medicines only go on red, rough, thick, or itchy patches of eczema. Continue to use your moisturizer on the footprints several times a day. Your moisturizer may help prevent new, itchy patches and footprints from forming. If you run out of medications or feel that your childs skin is getting worse despite following your treatment plan, please call us. If you think that you will run out of medications over the weekend or during a holiday, please try to call us during normal business hours so that we may get you the refills you need without delay.           Photo surveillance performed: Damian Jovel

## 2023-05-03 NOTE — H&P PST ADULT - NSICDXFAMILYHX_GEN_ALL_CORE_FT
FAMILY HISTORY:  Father  Still living? Unknown  FH: diabetes mellitus, Age at diagnosis: Age Unknown    Mother  Still living? Unknown  FH: dementia, Age at diagnosis: Age Unknown  FH: diabetes mellitus, Age at diagnosis: Age Unknown

## 2023-05-03 NOTE — H&P PST ADULT - ASSESSMENT
63  year old female with synovitis of right knee s/p right TKR 2009 ;  pt c/o right knee pain and clicking sound and difficulty with walking down stairs due to pain and limited ROM   ; she presents to PST for right knee arthroscopy      Plan:  1. PST instructions given ; NPO status/  instructions to be given by ASU   2. Pt instructed to take following meds on day of surgery: albuterol prn   3. Pt instructed to take routine evening medications unless indicated   4. Stop NSAIDS ( Aspirin Alev Motrin Mobic Diclofenac), herbal supplements , MVI , Vitamin fish oil 7 days prior to surgery  unless   directed by surgeon or cardiologist;   5. Pulmonary Optimization Dr Christie   6. EZ wash instructions give  7. Labs EKG  as per surgeon request   8. Pt denies covid symptoms shortness of breath fever cough

## 2023-05-04 DIAGNOSIS — Z01.818 ENCOUNTER FOR OTHER PREPROCEDURAL EXAMINATION: ICD-10-CM

## 2023-05-04 NOTE — IMAGING
[de-identified] : Examination of the right lower extremity reveals normal neurovascular exam.  Examination of the right knee reveals a well-healed midline scar.  There is moderate peripatellar crepitation.  There is no effusion.  There is no instability in flexion, mid flexion or extension.  Right hip exam is normal.  There is no redness or rashes.

## 2023-05-04 NOTE — HISTORY OF PRESENT ILLNESS
[Work related] : work related [8] : 8 [Sharp] : sharp [Constant] : constant [Meds] : meds [Walking] : walking [Stairs] : stairs [Lying in bed] : lying in bed [Not working due to injury] : Work status: not working due to injury [] : no [FreeTextEntry1] : Rt. Knee [FreeTextEntry3] : 10/29/2002 [FreeTextEntry5] : 62 y/o F presents for [facility] eval. of Rt. Knee. Pt reports of increased pain levels since last visit with no associated trauma.  [FreeTextEntry9] : Oxycodone prn [de-identified] : 9/29/2021 [de-identified] : Dr. Sullivan [de-identified] :

## 2023-05-04 NOTE — DISCUSSION/SUMMARY
[de-identified] : Plan at this time is to proceed with right knee arthroscopy and synovectomy for patellar clunk syndrome.  All risks benefits and alternatives of the procedure were discussed the patient detail and she wishes to proceed.  She has 100% temporary partial disability to the right knee.  Her right knee pain is causally related to her work accident.  I will see her back 2 weeks after surgery.

## 2023-05-04 NOTE — ASSESSMENT
[FreeTextEntry1] : Patient comes in today follow-up on her right knee.  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  She has pain particularly going up and down stairs and getting in and out of chairs.  She has mild pain with walking.  She has no pain at rest or at night.  She gets occasional swelling.  She has failed conservative management.  She is currently not working.

## 2023-05-09 ENCOUNTER — TRANSCRIPTION ENCOUNTER (OUTPATIENT)
Age: 63
End: 2023-05-09

## 2023-05-09 ENCOUNTER — APPOINTMENT (OUTPATIENT)
Dept: ORTHOPEDIC SURGERY | Facility: HOSPITAL | Age: 63
End: 2023-05-09

## 2023-05-09 ENCOUNTER — OUTPATIENT (OUTPATIENT)
Dept: INPATIENT UNIT | Facility: HOSPITAL | Age: 63
LOS: 1 days | Discharge: HOME | End: 2023-05-09
Payer: COMMERCIAL

## 2023-05-09 ENCOUNTER — RESULT REVIEW (OUTPATIENT)
Age: 63
End: 2023-05-09

## 2023-05-09 VITALS
OXYGEN SATURATION: 97 % | HEIGHT: 63 IN | RESPIRATION RATE: 16 BRPM | TEMPERATURE: 98 F | WEIGHT: 147.93 LBS | HEART RATE: 81 BPM | SYSTOLIC BLOOD PRESSURE: 128 MMHG | DIASTOLIC BLOOD PRESSURE: 68 MMHG

## 2023-05-09 VITALS
TEMPERATURE: 98 F | DIASTOLIC BLOOD PRESSURE: 79 MMHG | SYSTOLIC BLOOD PRESSURE: 152 MMHG | HEART RATE: 90 BPM | RESPIRATION RATE: 18 BRPM | OXYGEN SATURATION: 106 %

## 2023-05-09 DIAGNOSIS — M12.261 VILLONODULAR SYNOVITIS (PIGMENTED), RIGHT KNEE: ICD-10-CM

## 2023-05-09 DIAGNOSIS — Z98.82 BREAST IMPLANT STATUS: Chronic | ICD-10-CM

## 2023-05-09 DIAGNOSIS — Z96.651 PRESENCE OF RIGHT ARTIFICIAL KNEE JOINT: Chronic | ICD-10-CM

## 2023-05-09 DIAGNOSIS — Z98.890 OTHER SPECIFIED POSTPROCEDURAL STATES: Chronic | ICD-10-CM

## 2023-05-09 DIAGNOSIS — Z96.652 PRESENCE OF LEFT ARTIFICIAL KNEE JOINT: Chronic | ICD-10-CM

## 2023-05-09 DIAGNOSIS — Z90.49 ACQUIRED ABSENCE OF OTHER SPECIFIED PARTS OF DIGESTIVE TRACT: Chronic | ICD-10-CM

## 2023-05-09 PROCEDURE — 88304 TISSUE EXAM BY PATHOLOGIST: CPT | Mod: 26

## 2023-05-09 PROCEDURE — 88304 TISSUE EXAM BY PATHOLOGIST: CPT

## 2023-05-09 PROCEDURE — 29876 ARTHRS KNEE SURG SYNVCT MAJ: CPT | Mod: RT

## 2023-05-09 RX ORDER — ESCITALOPRAM OXALATE 10 MG/1
1 TABLET, FILM COATED ORAL
Refills: 0 | DISCHARGE

## 2023-05-09 RX ORDER — FENTANYL CITRATE 50 UG/ML
50 INJECTION INTRAVENOUS
Refills: 0 | Status: DISCONTINUED | OUTPATIENT
Start: 2023-05-09 | End: 2023-05-09

## 2023-05-09 RX ORDER — OXYCODONE HYDROCHLORIDE 5 MG/1
1 TABLET ORAL
Qty: 12 | Refills: 0
Start: 2023-05-09

## 2023-05-09 RX ORDER — TIOTROPIUM BROMIDE 18 UG/1
1 CAPSULE ORAL; RESPIRATORY (INHALATION)
Refills: 0 | DISCHARGE

## 2023-05-09 RX ORDER — ASPIRIN/CALCIUM CARB/MAGNESIUM 324 MG
1 TABLET ORAL
Qty: 30 | Refills: 0
Start: 2023-05-09 | End: 2023-06-07

## 2023-05-09 RX ORDER — MONTELUKAST 4 MG/1
1 TABLET, CHEWABLE ORAL
Qty: 0 | Refills: 0 | DISCHARGE

## 2023-05-09 RX ORDER — OXYCODONE HYDROCHLORIDE 5 MG/1
5 TABLET ORAL ONCE
Refills: 0 | Status: DISCONTINUED | OUTPATIENT
Start: 2023-05-09 | End: 2023-05-09

## 2023-05-09 RX ORDER — ONDANSETRON 8 MG/1
4 TABLET, FILM COATED ORAL ONCE
Refills: 0 | Status: DISCONTINUED | OUTPATIENT
Start: 2023-05-09 | End: 2023-05-09

## 2023-05-09 RX ORDER — ALBUTEROL 90 UG/1
2 AEROSOL, METERED ORAL
Qty: 0 | Refills: 0 | DISCHARGE

## 2023-05-09 RX ORDER — LINACLOTIDE 145 UG/1
1 CAPSULE, GELATIN COATED ORAL
Refills: 0 | DISCHARGE

## 2023-05-09 RX ORDER — ZOLPIDEM TARTRATE 10 MG/1
1 TABLET ORAL
Qty: 0 | Refills: 0 | DISCHARGE

## 2023-05-09 RX ORDER — FLUTICASONE FUROATE AND VILANTEROL TRIFENATATE 100; 25 UG/1; UG/1
1 POWDER RESPIRATORY (INHALATION)
Qty: 0 | Refills: 0 | DISCHARGE

## 2023-05-09 RX ORDER — SODIUM CHLORIDE 9 MG/ML
1000 INJECTION, SOLUTION INTRAVENOUS
Refills: 0 | Status: DISCONTINUED | OUTPATIENT
Start: 2023-05-09 | End: 2023-05-09

## 2023-05-09 RX ADMIN — FENTANYL CITRATE 50 MICROGRAM(S): 50 INJECTION INTRAVENOUS at 16:00

## 2023-05-09 RX ADMIN — FENTANYL CITRATE 50 MICROGRAM(S): 50 INJECTION INTRAVENOUS at 16:15

## 2023-05-09 RX ADMIN — OXYCODONE HYDROCHLORIDE 5 MILLIGRAM(S): 5 TABLET ORAL at 17:00

## 2023-05-09 RX ADMIN — SODIUM CHLORIDE 125 MILLILITER(S): 9 INJECTION, SOLUTION INTRAVENOUS at 16:00

## 2023-05-09 RX ADMIN — OXYCODONE HYDROCHLORIDE 5 MILLIGRAM(S): 5 TABLET ORAL at 16:00

## 2023-05-09 RX ADMIN — FENTANYL CITRATE 50 MICROGRAM(S): 50 INJECTION INTRAVENOUS at 16:30

## 2023-05-09 RX ADMIN — FENTANYL CITRATE 50 MICROGRAM(S): 50 INJECTION INTRAVENOUS at 16:16

## 2023-05-09 NOTE — BRIEF OPERATIVE NOTE - NSICDXBRIEFPREOP_GEN_ALL_CORE_FT
PRE-OP DIAGNOSIS:  S/P total knee arthroplasty 09-May-2023 15:48:10 with patellar clunk Juan Francisco Ferraro

## 2023-05-09 NOTE — ASU PATIENT PROFILE, ADULT - FALL HARM RISK - UNIVERSAL INTERVENTIONS
Bed in lowest position, wheels locked, appropriate side rails in place/Call bell, personal items and telephone in reach/Instruct patient to call for assistance before getting out of bed or chair/Non-slip footwear when patient is out of bed/[place] to call system/Physically safe environment - no spills, clutter or unnecessary equipment/Purposeful Proactive Rounding/Room/bathroom lighting operational, light cord in reach

## 2023-05-09 NOTE — BRIEF OPERATIVE NOTE - NSICDXBRIEFPOSTOP_GEN_ALL_CORE_FT
POST-OP DIAGNOSIS:  S/P total knee arthroplasty 09-May-2023 15:48:25 with patellar clunk Juan Francisco Ferraro

## 2023-05-09 NOTE — ASU PATIENT PROFILE, ADULT - PAIN RATING AT REST
Post-Dialysis RN Treatment Note    Blood Pressure:  Pre-113/59 mm/Hg  Post-114/61 mmHg   EDW-TBD   Weight:  Pre-93 8 kg   Post-92 5 kg   Mode of weight measurement: bed scale   Volume Removed-1500 ml    Treatment duration-180 minutes    NS given- NO   Treatment shortened? NO   Medications given during Rx- Tylenol 650 mg due to pain   Estimated Kt/V-0 86   Access type: R IJ catheter   Access Status: YES-    Report called to primary nurse- YES, Analilia Napier RN    Goal- remove 1-2 L as tolerated per Dr Cynthia Fernando using 3 K+bath over 3 hr hd tx         Problem: METABOLIC, FLUID AND ELECTROLYTES - ADULT  Goal: Electrolytes maintained within normal limits  Description: INTERVENTIONS:  - Monitor labs and assess patient for signs and symptoms of electrolyte imbalances  - Administer electrolyte replacement as ordered  - Monitor response to electrolyte replacements, including repeat lab results as appropriate  - Instruct patient on fluid and nutrition as appropriate  Outcome: Progressing 7

## 2023-05-09 NOTE — ASU DISCHARGE PLAN (ADULT/PEDIATRIC) - ASU DC SPECIAL INSTRUCTIONSFT
Knee Arthroscopy Instructions    1) Your knee will swell over the next 48hours. Ice your Knee plenty, continuously if possible. Fill up a plastic bag, then wrap it in a towel or pillow case.     2) Elevate your leg above your heart with about 3 pillows when you can, when you are in bed or chair. Otherwise you should be up and about, walking as much as you can tolerate. The more you move the better you will do. Weight bearing as tolerated.    3) BANDAGE: Expect some mild bloody drainage. This is mostly leftover Saline fluid coming out of your knee from surgery. Just place another Gauze and another ACE over it and wrap it snug but not overly tight. If it bleeds through the second bandage again, call.    4) SHOWER: Remove bandage in 48hrs and place Waterproof Band Aides. You can shower in 48 hours. No bath. Pat your incisions dry. No creams, no lotions.    5) If pain gets so severe that you cannot feel or wiggle your toes, or if you have high fever you need to call or come to the ER. But as long as you can feel and wiggle your toes, you are usually fine.    6) Call the office to schedule a follow up appointment to see Dr. HINSON in 10-14 days. Your Sutures will be removed at that point.    7) A pain Rx is in the chart; fill it on your way home. OR was sent electronically to your pharmacy, pick it up on the way home.    8) Take a full Aspirn EC 325mg daily for 2 weeks. This is to prevent blood clots.

## 2023-05-09 NOTE — BRIEF OPERATIVE NOTE - NSICDXBRIEFPROCEDURE_GEN_ALL_CORE_FT
PROCEDURES:  Arthroscopy, knee, with limited synovectomy 09-May-2023 15:47:21 right knee scope with TKA, synovectomy Juan Francisco Ferraro

## 2023-05-15 ENCOUNTER — APPOINTMENT (OUTPATIENT)
Dept: ORTHOPEDIC SURGERY | Facility: CLINIC | Age: 63
End: 2023-05-15

## 2023-05-16 LAB — SURGICAL PATHOLOGY STUDY: SIGNIFICANT CHANGE UP

## 2023-05-17 DIAGNOSIS — M19.90 UNSPECIFIED OSTEOARTHRITIS, UNSPECIFIED SITE: ICD-10-CM

## 2023-05-17 DIAGNOSIS — Z90.49 ACQUIRED ABSENCE OF OTHER SPECIFIED PARTS OF DIGESTIVE TRACT: ICD-10-CM

## 2023-05-17 DIAGNOSIS — Z91.040 LATEX ALLERGY STATUS: ICD-10-CM

## 2023-05-17 DIAGNOSIS — F32.A DEPRESSION, UNSPECIFIED: ICD-10-CM

## 2023-05-17 DIAGNOSIS — M25.861 OTHER SPECIFIED JOINT DISORDERS, RIGHT KNEE: ICD-10-CM

## 2023-05-17 DIAGNOSIS — G47.00 INSOMNIA, UNSPECIFIED: ICD-10-CM

## 2023-05-17 DIAGNOSIS — Z96.653 PRESENCE OF ARTIFICIAL KNEE JOINT, BILATERAL: ICD-10-CM

## 2023-05-17 DIAGNOSIS — J45.909 UNSPECIFIED ASTHMA, UNCOMPLICATED: ICD-10-CM

## 2023-05-17 DIAGNOSIS — M11.261 OTHER CHONDROCALCINOSIS, RIGHT KNEE: ICD-10-CM

## 2023-05-17 DIAGNOSIS — M65.9 SYNOVITIS AND TENOSYNOVITIS, UNSPECIFIED: ICD-10-CM

## 2023-05-24 ENCOUNTER — APPOINTMENT (OUTPATIENT)
Dept: ORTHOPEDIC SURGERY | Facility: CLINIC | Age: 63
End: 2023-05-24
Payer: OTHER MISCELLANEOUS

## 2023-05-24 VITALS — HEIGHT: 63 IN | BODY MASS INDEX: 26.4 KG/M2 | WEIGHT: 149 LBS

## 2023-05-24 PROBLEM — H26.9 UNSPECIFIED CATARACT: Chronic | Status: ACTIVE | Noted: 2023-05-03

## 2023-05-24 PROBLEM — M19.90 UNSPECIFIED OSTEOARTHRITIS, UNSPECIFIED SITE: Chronic | Status: ACTIVE | Noted: 2023-05-03

## 2023-05-24 PROCEDURE — 99024 POSTOP FOLLOW-UP VISIT: CPT

## 2023-05-24 RX ORDER — OXYCODONE AND ACETAMINOPHEN 10; 325 MG/1; MG/1
10-325 TABLET ORAL
Qty: 42 | Refills: 0 | Status: ACTIVE | COMMUNITY
Start: 2023-05-24 | End: 1900-01-01

## 2023-05-25 NOTE — ASSESSMENT
[FreeTextEntry1] : The patient is approx 2 weeks s/p a right knee arthroscopic patellar synovectomy s/p a right TKA on 5/9/23. The patient denies fever, chills, CP, SOB. The patient denies drainage or discharge from their incision. The patient is doing well postoperatively. She is here for suture removal and start of PT. She reports pain at end ranges and with ambulation but feels her ROM is improved. She is using pain medications with control of her pain. She will restart PT at this time. \par \par Right knee exam: Neurovascularly intact. Sensation intact.  Examination of the right knee reveals a well-healed midline scar.  Operative incisions are clean, dry, intact. Sutures removed. Steri-strips placed.  There is no effusion.  There is no instability in flexion, mid flexion or extension.  Mild crepitation. Ranging 5-110.\par \par Plan is ice and activity modification.  She has 100% temporary disability to the right knee.  Right knee pain is causally related to her work accident.  She is currently not working.  I will see her back in the office in 6 to 8 weeks for reevaluation.  She will do home exercises.

## 2023-05-25 NOTE — HISTORY OF PRESENT ILLNESS
[8] : 8 [0] : 0 [Localized] : localized [Sharp] : sharp [Not working due to injury] : Work status: not working due to injury [] : This patient has had an injection before: no [FreeTextEntry1] : R Knee [FreeTextEntry3] : 10/29/2002 [FreeTextEntry5] : 64 y/o LHD F eval R knee s/p R Knee scope on 5/9/23 due to work injury on above DOI. Pt states recovery is going well with some pain persisting  [de-identified] : None [de-identified] :   [de-identified] : 5/9/23 [de-identified] : R Knee arthroscopic synovectomy

## 2023-07-26 ENCOUNTER — APPOINTMENT (OUTPATIENT)
Dept: ORTHOPEDIC SURGERY | Facility: CLINIC | Age: 63
End: 2023-07-26
Payer: OTHER MISCELLANEOUS

## 2023-07-26 VITALS — BODY MASS INDEX: 26.22 KG/M2 | WEIGHT: 148 LBS | HEIGHT: 63 IN

## 2023-07-26 PROCEDURE — 99214 OFFICE O/P EST MOD 30 MIN: CPT | Mod: 24

## 2023-08-10 ENCOUNTER — APPOINTMENT (OUTPATIENT)
Dept: ORTHOPEDIC SURGERY | Facility: CLINIC | Age: 63
End: 2023-08-10

## 2023-08-13 NOTE — HISTORY OF PRESENT ILLNESS
[6] : 6 [5] : 5 [Localized] : localized [Sharp] : sharp [Not working due to injury] : Work status: not working due to injury [] : This patient has had an injection before: no [FreeTextEntry1] : R Knee [FreeTextEntry3] : 10/29/2002 [FreeTextEntry5] : 62 y/o LHD F eval R knee s/p R Knee scope on 5/9/23 due to work injury on above DOI. Pt states recovery is going well with some pain persisting  [de-identified] : None [de-identified] :   [de-identified] : 5/9/23 [de-identified] : R Knee arthroscopic synovectomy

## 2023-08-13 NOTE — ASSESSMENT
[FreeTextEntry1] : The patient comes in for follow-up on her right knee.  She is now 10 weeks out from a right knee arthroscopy and synovectomy done on May 9, 2023.  Overall she is doing well from the surgery however she continues to get slight clicking and sore with stairs.  She is improved going from sitting to standing.  She is currently not working.  Examination right lower extremity reveals normal neurovascular exam.  Examination the right knee reveals range of motion 0 to 125 degrees with normal patellar tracking.  Her scars are well-healed with no signs of infection or DVT.  She is a trace effusion and mild crepitation.  There is no instability or apprehension and normal right hip exam.  Plan at this time is to continue home exercises.  She will continue to modify her activities.  She has 100% total disability to the right knee.  She is currently not working.  Her right knee pain is causally related to her work accident.  I will see her back in the office in 3 months.

## 2023-11-13 ENCOUNTER — APPOINTMENT (OUTPATIENT)
Dept: ORTHOPEDIC SURGERY | Facility: CLINIC | Age: 63
End: 2023-11-13
Payer: OTHER MISCELLANEOUS

## 2023-11-13 VITALS — HEIGHT: 63 IN | BODY MASS INDEX: 26.22 KG/M2 | WEIGHT: 148 LBS

## 2023-11-13 DIAGNOSIS — M12.261 VILLONODULAR SYNOVITIS (PIGMENTED), RIGHT KNEE: ICD-10-CM

## 2023-11-13 PROCEDURE — 99213 OFFICE O/P EST LOW 20 MIN: CPT

## 2023-12-11 ENCOUNTER — APPOINTMENT (OUTPATIENT)
Dept: ORTHOPEDIC SURGERY | Facility: CLINIC | Age: 63
End: 2023-12-11

## 2024-02-21 ENCOUNTER — APPOINTMENT (OUTPATIENT)
Dept: ORTHOPEDIC SURGERY | Facility: CLINIC | Age: 64
End: 2024-02-21

## 2024-04-08 ENCOUNTER — APPOINTMENT (OUTPATIENT)
Dept: ORTHOPEDIC SURGERY | Facility: CLINIC | Age: 64
End: 2024-04-08

## 2024-06-03 ENCOUNTER — APPOINTMENT (OUTPATIENT)
Dept: ORTHOPEDIC SURGERY | Facility: CLINIC | Age: 64
End: 2024-06-03
Payer: OTHER MISCELLANEOUS

## 2024-06-03 VITALS — BODY MASS INDEX: 26.58 KG/M2 | WEIGHT: 150 LBS | HEIGHT: 63 IN

## 2024-06-03 DIAGNOSIS — M12.262 VILLONODULAR SYNOVITIS (PIGMENTED), LEFT KNEE: ICD-10-CM

## 2024-06-03 DIAGNOSIS — Z96.652 PRESENCE OF LEFT ARTIFICIAL KNEE JOINT: ICD-10-CM

## 2024-06-03 DIAGNOSIS — M17.32 UNILATERAL POST-TRAUMATIC OSTEOARTHRITIS, LEFT KNEE: ICD-10-CM

## 2024-06-03 PROCEDURE — 73564 X-RAY EXAM KNEE 4 OR MORE: CPT | Mod: LT

## 2024-06-03 PROCEDURE — 20610 DRAIN/INJ JOINT/BURSA W/O US: CPT | Mod: LT

## 2024-06-03 PROCEDURE — 99213 OFFICE O/P EST LOW 20 MIN: CPT | Mod: 25

## 2024-06-03 PROCEDURE — J3490M: CUSTOM

## 2024-06-05 PROBLEM — Z96.652 PRESENCE OF LEFT ARTIFICIAL KNEE JOINT: Status: ACTIVE | Noted: 2022-11-04

## 2024-06-05 PROBLEM — M12.262 PIGMENTED VILLONODULAR SYNOVITIS OF LEFT KNEE: Status: ACTIVE | Noted: 2022-11-04

## 2024-06-05 PROBLEM — M17.32 POST-TRAUMATIC OSTEOARTHRITIS OF LEFT KNEE: Status: ACTIVE | Noted: 2022-11-04

## 2024-06-05 NOTE — ASSESSMENT
[FreeTextEntry1] : The patient comes in today for follow-up on her left knee.  Over the last 6 months she has had increasing pain in the left knee consistent with walking and stairs.  She had her left knee replaced by me in 2015.  The knee gets stuck from time to time.  She has mostly discomfort when she goes from sitting to standing at the stairs.  She is currently not working.  Examination of the left lower extremity reveals normal neurovascular exam.  Examination of the left knee reveals a well-healed midline scar.  Range of motion 0 to 125 degrees with normal patellar tracking.  She has mild to moderate crepitation with a mild patella clunk.  There is no instability or apprehension.  X-rays done in the office today of the left knee 4 views show the left total knee prosthesis in normal alignment in good position with no signs of loosening or wear.  There are no obvious tumors, masses or calcification.  Under sterile conditions the left knee was injected with 5 cc of quarter percent plain Marcaine; 5 cc of 2% plain lidocaine and 80 mg of Depo-Medrol.  Patient tolerated the procedure well.  The plan at this time is activity modification.  I will consider blood work and a three-phase bone scan to rule out loosening or infection if her symptoms do not improve with the cortisone shot.  She is currently 100% disabled to her left knee.  Left knee pain is causally related to her work accident.  She will continue to stay out of work at this time.  I will see her back in the office in 3 months.

## 2024-06-05 NOTE — HISTORY OF PRESENT ILLNESS
[8] : 8 [4] : 4 [Dull/Aching] : dull/aching [Stabbing] : stabbing [] : no [FreeTextEntry5] : 64 y.o patient is here for left knee pain today. No injury reported. States a year ago she was having pain in the knee and now it has affected her walking.

## 2024-06-26 ENCOUNTER — APPOINTMENT (OUTPATIENT)
Dept: ORTHOPEDIC SURGERY | Facility: CLINIC | Age: 64
End: 2024-06-26
Payer: OTHER MISCELLANEOUS

## 2024-06-26 VITALS — BODY MASS INDEX: 26.93 KG/M2 | WEIGHT: 152 LBS | HEIGHT: 63 IN

## 2024-06-26 DIAGNOSIS — M17.31 UNILATERAL POST-TRAUMATIC OSTEOARTHRITIS, RIGHT KNEE: ICD-10-CM

## 2024-06-26 PROCEDURE — 99213 OFFICE O/P EST LOW 20 MIN: CPT

## 2024-06-26 RX ORDER — OXYCODONE 5 MG/1
5 TABLET ORAL
Qty: 42 | Refills: 0 | Status: ACTIVE | COMMUNITY
Start: 2024-06-26 | End: 1900-01-01

## 2025-03-17 ENCOUNTER — APPOINTMENT (OUTPATIENT)
Facility: CLINIC | Age: 65
End: 2025-03-17
Payer: OTHER MISCELLANEOUS

## 2025-03-17 VITALS — WEIGHT: 149 LBS | HEIGHT: 63 IN | BODY MASS INDEX: 26.4 KG/M2

## 2025-03-17 DIAGNOSIS — M12.261 VILLONODULAR SYNOVITIS (PIGMENTED), RIGHT KNEE: ICD-10-CM

## 2025-03-17 DIAGNOSIS — M17.31 UNILATERAL POST-TRAUMATIC OSTEOARTHRITIS, RIGHT KNEE: ICD-10-CM

## 2025-03-17 PROCEDURE — 99213 OFFICE O/P EST LOW 20 MIN: CPT
